# Patient Record
Sex: FEMALE | URBAN - METROPOLITAN AREA
[De-identification: names, ages, dates, MRNs, and addresses within clinical notes are randomized per-mention and may not be internally consistent; named-entity substitution may affect disease eponyms.]

---

## 2020-07-30 ENCOUNTER — VIRTUAL VISIT (OUTPATIENT)
Dept: FAMILY MEDICINE | Facility: OTHER | Age: 40
End: 2020-07-30
Payer: COMMERCIAL

## 2020-07-30 DIAGNOSIS — R05.9 COUGH: Primary | ICD-10-CM

## 2020-07-30 PROCEDURE — 99421 OL DIG E/M SVC 5-10 MIN: CPT | Performed by: PHYSICIAN ASSISTANT

## 2020-07-30 PROCEDURE — U0003 INFECTIOUS AGENT DETECTION BY NUCLEIC ACID (DNA OR RNA); SEVERE ACUTE RESPIRATORY SYNDROME CORONAVIRUS 2 (SARS-COV-2) (CORONAVIRUS DISEASE [COVID-19]), AMPLIFIED PROBE TECHNIQUE, MAKING USE OF HIGH THROUGHPUT TECHNOLOGIES AS DESCRIBED BY CMS-2020-01-R: HCPCS | Performed by: FAMILY MEDICINE

## 2020-07-31 ENCOUNTER — NURSE TRIAGE (OUTPATIENT)
Dept: NURSING | Facility: CLINIC | Age: 40
End: 2020-07-31

## 2020-07-31 LAB
SARS-COV-2 RNA SPEC QL NAA+PROBE: NOT DETECTED
SPECIMEN SOURCE: NORMAL

## 2020-07-31 NOTE — TELEPHONE ENCOUNTER
Pt is calling.    Dry cough and a wet cough.  It started out dry and then became wet. She was seen by her asthma specialist a COVID-19 test was ordered by her asthma doctor. Not a [facility] patient, but had her test done with us.   She is wanting her COVID-19 results. Test was done yesterday and entered at 2:15pm.   Test is still in process.  She is waiting for her results and was told that her results would be in, in 24 hours. It has been 24 hours, and she is very angry.  She was told that she would get a call today, if positive or negative.  She stated that her treatment plan depends on whether to go on prednisone or not. If positive, they do not want her taking it.  I advised her to set up a [employer] account so that she can keep an eye on her results, and to contact her asthma specialist as for further instructions. They may contact her once the results come in. I stated that I did not know their plans, so to contact their office before they close today.  I was unable to calmly calm her down. She was very upset.  She was willing to set up the [employer] account.  Transferred to scheduling to set up her [employer] account. I did stay on the line while she spoke to scheduling. She was very upset with scheduling as well, and stated that she will just hang up, and did not want to set up the [employer] account. Patient then hung up the phone.     Reason for Disposition    [1] Angry or rude caller AND [2] doesn't respond to 5 minutes of triager counseling AND [3] sick adult (or caller)    Additional Information    Negative: Violent behavior, or threatening to physically hurt or kill someone    Negative: [1] Caller is very confused AND [2] no other adult (e.g., friend or family member) available    Negative: Sounds like a life-threatening emergency to the triager    Negative: Child abuse suspected    Negative: Suicide Concerns    Negative: Patient sounds very sick or weak to the triager    Negative: [1] [place] worried caller AND  [2] second call within 4 hours about the same medical problem    Negative: [1] [place] worried caller AND [2] third call within 48 hours about the same medical problem    Negative: [1] [place] worried caller AND [2] can't be reassured by triager    Negative: [1] Caller demands to speak with the PCP AND [2] about sick adult (or sick caller)    Protocols used: DIFFICULT CALLER-A-    Mari Nunes RN on 7/31/2020 at 3:47 PM

## 2020-07-31 NOTE — PROGRESS NOTES
"Date: 2020 07:17:58  Clinician: Otis Kim  Clinician NPI: [id number]  Patient: Monica Serrano  Patient : 1980  Patient Address: [address] [place] [address]  Patient Phone: [phone]  Visit Protocol: URI  Patient Summary:  Monica is a 39 year old ( : 1980 ) female who initiated a Visit for COVID-19 (Coronavirus) evaluation and screening. When asked the question \"Please sign me up to receive news, health information and promotions from [employer].\", Monica responded \"No\".    Monica states her symptoms started 1-2 days ago.   Her symptoms consist of wheezing, ageusia, myalgia, a cough, nasal congestion, rhinitis, and malaise. Monica also feels feverish.   Symptom details     Nasal secretions: The color of her mucus is clear.    Cough: Monica coughs every 5-10 minutes and her cough is more bothersome at night. Phlegm comes into her throat when she coughs. She does not believe her cough is caused by post-nasal drip. The color of the phlegm is white.     Temperature: Her current temperature is 98.3 degrees Fahrenheit.     Wheezing: Monica has been diagnosed with asthma. Additional wheezing details as reported by the patient (free text): wheeze with every breath. I habe asthma.        Monica denies having nausea, teeth pain, diarrhea, sore throat, anosmia, facial pain or pressure, vomiting, ear pain, headache, and chills. She also denies having recent facial or sinus surgery in the past 60 days and taking antibiotic medication in the past month.   Precipitating events  She has not recently been exposed to someone with influenza. Monica has been in close contact with the following high risk individuals: adults 65 or older and people with asthma, heart disease or diabetes.   Pertinent COVID-19 (Coronavirus) information  In the past 14 days, Monica has not worked in a congregate living setting.   She does not work or volunteer as healthcare worker or a  and does not work or " volunteer in a healthcare facility.   Monica also has not lived in a congregate living setting in the past 14 days. She does not live with a healthcare worker.   Monica has not had a close contact with a laboratory-confirmed COVID-19 patient within 14 days of symptom onset.   Triage Point(s) temporarily suspended for COVID-19 (Coronavirus) screening  Monica reported the following symptoms which were previously protocol referral points. These protocol referral points have temporarily been removed for purposes of COVID-19 (Coronavirus) screening.   Difficulty breathing even when resting and can only speak in phrase(s)   Pertinent medical history  Monica typically gets a yeast infection when she takes antibiotics. She is not sure if she has used fluconazole (Diflucan) to treat previous yeast infections.   Monica needs a return to work/school note.   Weight: 145 lbs   Monica smokes or uses smokeless tobacco.   She denies pregnancy and denies breastfeeding. She has menstruated in the past month.   Additional information as reported by the patient (free text): Please call me [phone]   Weight: 145 lbs  A synchronous phone visit was initiated by the provider for the following reason: pt requests call, clarify shortness of breath    MEDICATIONS: albuterol sulfate inhalation, Advair Diskus inhalation, ALLERGIES: NKDA  Clinician Response:  Dear Monica,   Your symptoms show that you may have coronavirus (COVID-19). This illness can cause fever, cough and trouble breathing. Many people get a mild case and get better on their own. Some people can get very sick.  What should I do?  We would like to test you for this virus.   1. Please call [phone] to schedule your visit. Explain that you were referred by [facility] to have a COVID-19 test. Be ready to share your [facility] visit ID number.  The following will serve as your written order for this COVID Test, ordered by me, for the indication of suspected COVID [Z20.828]: The test  "will be ordered in [facility], our electronic health record, after you are scheduled. It will show as ordered and authorized by Socrates Harris MD.  Order: COVID-19 (Coronavirus) PCR for SYMPTOMATIC testing from [facility].      2. When it's time for your COVID test:  Stay at least 6 feet away from others. (If someone will drive you to your test, stay in the backseat, as far away from the  as you can.)   Cover your mouth and nose with a mask, tissue or washcloth.  Go straight to the testing site. Don't make any stops on the way there or back.      3.Starting now: Stay home and away from others (self-isolate) until:   You've had no fever---and no medicine that reduces fever---for 3 full days (72 hours). And...   Your other symptoms have gotten better. For example, your cough or breathing has improved. And...   At least 10 days have passed since your symptoms started.       During this time, don't leave the house except for testing or medical care.   Stay in your own room, even for meals. Use your own bathroom if you can.   Stay away from others in your home. No hugging, kissing or shaking hands. No visitors.  Don't go to work, school or anywhere else.    Clean \"high touch\" surfaces often (doorknobs, counters, handles, etc.). Use a household cleaning spray or wipes. You'll find a full list of  on the EPA website: www.epa.gov/pesticide-registration/list-n-disinfectants-use-against-sars-cov-2.   Cover your mouth and nose with a mask, tissue or washcloth to avoid spreading germs.  Wash your hands and face often. Use soap and water.  Caregivers in these groups are at risk for severe illness due to COVID-19:  o People 65 years and older  o People who live in a nursing home or long-term care facility  o People with chronic disease (lung, heart, cancer, diabetes, kidney, liver, immunologic)  o People who have a weakened immune system, including those who:   Are in cancer treatment  Take medicine that weakens the immune " system, such as corticosteroids  Had a bone marrow or organ transplant  Have an immune deficiency  Have poorly controlled HIV or AIDS  Are obese (body mass index of 40 or higher)  Smoke regularly   o Caregivers should wear gloves while washing dishes, handling laundry and cleaning bedrooms and bathrooms.  o Use caution when washing and drying laundry: Don't shake dirty laundry, and use the warmest water setting that you can.  o For more tips, go to www.cdc.gov/coronavirus/2019-ncov/downloads/10Things.pdf.    4.Sign up for [employer]. We know it's scary to hear that you might have COVID-19. We want to track your symptoms to make sure you're okay over the next 2 weeks. Please look for an email from [employer]---this is a free, online program that we'll use to keep in touch. To sign up, follow the link in the email. Learn more at [email or web address]  How can I take care of myself?   Get lots of rest. Drink extra fluids (unless a doctor has told you not to).   Take Tylenol (acetaminophen) for fever or pain. If you have liver or kidney problems, ask your family doctor if it's okay to take Tylenol.   Adults can take either:    650 mg (two 325 mg pills) every 4 to 6 hours, or...   1,000 mg (two 500 mg pills) every 8 hours as needed.    Note: Don't take more than 3,000 mg in one day. Acetaminophen is found in many medicines (both prescribed and over-the-counter medicines). Read all labels to be sure you don't take too much.   For children, check the Tylenol bottle for the right dose. The dose is based on the child's age or weight.    If you have other health problems (like cancer, heart failure, an organ transplant or severe kidney disease): Call your specialty clinic if you don't feel better in the next 2 days.       Know when to call 911. Emergency warning signs include:    Trouble breathing or shortness of breath Pain or pressure in the chest that doesn't go away Feeling confused like you haven't felt  before, or not being able to wake up Bluish-colored lips or face.  Where can I get more information?   [facility] -- About COVID-19: [email or web address]   CDC -- What to Do If You're Sick: www.cdc.gov/coronavirus/2019-ncov/about/steps-when-sick.html   CDC -- Ending Home Isolation: www.cdc.gov/coronavirus/2019-ncov/hcp/disposition-in-home-patients.html   [facility] -- Caring for Someone: www.cdc.gov/coronavirus/2019-ncov/if-you-are-sick/care-for-someone.html   [facility] -- Interim Guidance for Hospital Discharge to Home: www.[facility].mn./diseases/coronavirus/hcp/hospdischarge.pdf   [facility] clinical trials (COVID-19 research studies): clinicalaffairs.[facility]-clinical-trials    Below are the COVID-19 hotlines at the [place] [facility] ([facility]). Interpreters are available.    For health questions: Call 320-897-7741 or 1-240.884.2666 (7 a.m. to 7 p.m.) For questions about schools and childcare: Call 298-039-6788 or 1-999.112.4724 (7 a.m. to 7 p.m.)    Diagnosis: Cough  Diagnosis ICD: R05  Triage Notes: I reviewed the patient's history, verified their identity, and explained the Visit process.    Has all asthma step up medication. will step up if symptoms worsen.  Synchronous Triage: phone, status: completed, duration: 850 seconds

## 2021-01-04 ENCOUNTER — HEALTH MAINTENANCE LETTER (OUTPATIENT)
Age: 41
End: 2021-01-04

## 2021-10-11 ENCOUNTER — HEALTH MAINTENANCE LETTER (OUTPATIENT)
Age: 41
End: 2021-10-11

## 2022-01-30 ENCOUNTER — HEALTH MAINTENANCE LETTER (OUTPATIENT)
Age: 42
End: 2022-01-30

## 2022-09-24 ENCOUNTER — HEALTH MAINTENANCE LETTER (OUTPATIENT)
Age: 42
End: 2022-09-24

## 2023-05-08 ENCOUNTER — HEALTH MAINTENANCE LETTER (OUTPATIENT)
Age: 43
End: 2023-05-08

## 2024-02-25 ENCOUNTER — HEALTH MAINTENANCE LETTER (OUTPATIENT)
Age: 44
End: 2024-02-25